# Patient Record
Sex: FEMALE | Race: WHITE | Employment: FULL TIME | URBAN - NONMETROPOLITAN AREA
[De-identification: names, ages, dates, MRNs, and addresses within clinical notes are randomized per-mention and may not be internally consistent; named-entity substitution may affect disease eponyms.]

---

## 2021-04-15 ENCOUNTER — ANESTHESIA (OUTPATIENT)
Dept: ENDOSCOPY | Age: 36
End: 2021-04-15

## 2021-04-15 ENCOUNTER — ANESTHESIA EVENT (OUTPATIENT)
Dept: ENDOSCOPY | Age: 36
End: 2021-04-15

## 2021-04-15 ENCOUNTER — HOSPITAL ENCOUNTER (OUTPATIENT)
Age: 36
Setting detail: OUTPATIENT SURGERY
Discharge: HOME | End: 2021-04-15
Attending: INTERNAL MEDICINE | Admitting: INTERNAL MEDICINE

## 2021-04-15 VITALS
DIASTOLIC BLOOD PRESSURE: 72 MMHG | OXYGEN SATURATION: 100 % | WEIGHT: 212 LBS | HEIGHT: 61 IN | RESPIRATION RATE: 18 BRPM | SYSTOLIC BLOOD PRESSURE: 118 MMHG | BODY MASS INDEX: 40.02 KG/M2 | HEART RATE: 84 BPM | TEMPERATURE: 97.4 F

## 2021-04-15 VITALS
DIASTOLIC BLOOD PRESSURE: 77 MMHG | OXYGEN SATURATION: 99 % | SYSTOLIC BLOOD PRESSURE: 116 MMHG | RESPIRATION RATE: 15 BRPM

## 2021-04-15 PROCEDURE — 2580000003 HC RX 258: Performed by: REGISTERED NURSE

## 2021-04-15 PROCEDURE — 3700000000 HC ANESTHESIA ATTENDED CARE: Performed by: INTERNAL MEDICINE

## 2021-04-15 PROCEDURE — 6360000002 HC RX W HCPCS: Performed by: REGISTERED NURSE

## 2021-04-15 PROCEDURE — 7100000010 HC PHASE II RECOVERY - FIRST 15 MIN: Performed by: INTERNAL MEDICINE

## 2021-04-15 PROCEDURE — 3609027000 HC COLONOSCOPY: Performed by: INTERNAL MEDICINE

## 2021-04-15 PROCEDURE — 3700000001 HC ADD 15 MINUTES (ANESTHESIA): Performed by: INTERNAL MEDICINE

## 2021-04-15 PROCEDURE — 2709999900 HC NON-CHARGEABLE SUPPLY: Performed by: INTERNAL MEDICINE

## 2021-04-15 PROCEDURE — 7100000011 HC PHASE II RECOVERY - ADDTL 15 MIN: Performed by: INTERNAL MEDICINE

## 2021-04-15 RX ORDER — ACETAMINOPHEN 500 MG
500 TABLET ORAL EVERY 6 HOURS PRN
COMMUNITY

## 2021-04-15 RX ORDER — SODIUM CHLORIDE 450 MG/100ML
INJECTION, SOLUTION INTRAVENOUS CONTINUOUS
Status: CANCELLED | OUTPATIENT
Start: 2021-04-15

## 2021-04-15 RX ORDER — PROPOFOL 10 MG/ML
INJECTION, EMULSION INTRAVENOUS PRN
Status: DISCONTINUED | OUTPATIENT
Start: 2021-04-15 | End: 2021-04-15 | Stop reason: SDUPTHER

## 2021-04-15 RX ORDER — IBUPROFEN 200 MG
200 TABLET ORAL EVERY 6 HOURS PRN
COMMUNITY

## 2021-04-15 RX ORDER — SODIUM CHLORIDE 450 MG/100ML
INJECTION, SOLUTION INTRAVENOUS CONTINUOUS PRN
Status: DISCONTINUED | OUTPATIENT
Start: 2021-04-15 | End: 2021-04-15 | Stop reason: SDUPTHER

## 2021-04-15 RX ADMIN — SODIUM CHLORIDE: 4.5 INJECTION, SOLUTION INTRAVENOUS at 09:10

## 2021-04-15 RX ADMIN — PROPOFOL 440 MG: 10 INJECTION, EMULSION INTRAVENOUS at 09:12

## 2021-04-15 ASSESSMENT — PAIN SCALES - GENERAL
PAINLEVEL_OUTOF10: 0
PAINLEVEL_OUTOF10: 0

## 2021-04-15 ASSESSMENT — PAIN - FUNCTIONAL ASSESSMENT: PAIN_FUNCTIONAL_ASSESSMENT: 0-10

## 2021-04-15 NOTE — ANESTHESIA PRE PROCEDURE
Department of Anesthesiology  Preprocedure Note       Name:  Chanda Perez   Age:  29 y.o.  :  1985                                          MRN:  [id number]         Date:  4/15/2021      Surgeon: Corinthia Goodpasture):  Odessa Lin MD    Procedure: Procedure(s):  COLORECTAL CANCER SCREENING, NOT HIGH RISK    Medications prior to admission:   Prior to Admission medications    Medication Sig Start Date End Date Taking? Authorizing Provider   ibuprofen (ADVIL;MOTRIN) 200 MG tablet Take 200 mg by mouth every 6 hours as needed for Pain Takes 2-4 at a time for back pain   Yes Historical Provider, MD   acetaminophen (TYLENOL) 500 MG tablet Take 500 mg by mouth every 6 hours as needed for Pain Takes 2-4 tablets as needed for pain   Yes Historical Provider, MD       Current medications:    No current facility-administered medications for this encounter. Allergies:  No Known Allergies    Problem List:  There is no problem list on file for this patient. Past Medical History:  No past medical history on file.     Past Surgical History:        Procedure Laterality Date    HYSTERECTOMY      partial \"still have my ovaries\"    MYRINGOTOMY AND TYMPANOSTOMY TUBE PLACEMENT  ? tubes placed in ears at young age   Murrel Neither TONSILLECTOMY  6    tonsils/adenoids       Social History:    Social History     Tobacco Use    Smoking status: Not on file   Substance Use Topics    Alcohol use: Not on file                                Counseling given: Not Answered      Vital Signs (Current):   Vitals:    04/15/21 0845   BP: 128/80   Pulse: 86   Resp: 16   Temp: 37.1 °C (98.8 °F)   TempSrc: Tympanic   SpO2: 100%   Weight: 212 lb (96.2 kg)   Height: 5' 1\" (1.549 m)                                              BP Readings from Last 3 Encounters:   04/15/21 128/80       NPO Status: Time of last liquid consumption: 0330                        Time of last solid consumption: 1800                        Date of last liquid consumption: 04/15/21                        Date of last solid food consumption: 04/13/21    BMI:   Wt Readings from Last 3 Encounters:   04/15/21 212 lb (96.2 kg)     Body mass index is 40.06 kg/m². CBC: No results found for: WBC, RBC, HGB, HCT, MCV, RDW, PLT    CMP:   Lab Results   Component Value Date    GLUCOSE 83 06/07/2017       POC Tests: No results for input(s): POCGLU, POCNA, POCK, POCCL, POCBUN, POCHEMO, POCHCT in the last 72 hours. Coags: No results found for: PROTIME, INR, APTT    HCG (If Applicable): No results found for: PREGTESTUR, PREGSERUM, HCG, HCGQUANT     ABGs: No results found for: PHART, PO2ART, UGY4IKU, KXQ0KJH, BEART, A8ZVLYMB     Type & Screen (If Applicable):  No results found for: LABABO, LABRH    Drug/Infectious Status (If Applicable):  No results found for: HIV, HEPCAB    COVID-19 Screening (If Applicable):   Lab Results   Component Value Date    COVID19 Not Detected 04/12/2021           Anesthesia Evaluation  Patient summary reviewed no history of anesthetic complications:   Airway: Mallampati: II        Dental: normal exam         Pulmonary:normal exam    (+) sleep apnea: on CPAP and noncompliant,                             Cardiovascular:Negative CV ROS                      Neuro/Psych:   Negative Neuro/Psych ROS              GI/Hepatic/Renal: Neg GI/Hepatic/Renal ROS            Endo/Other:    (+) blood dyscrasia: Factor V:., .                 Abdominal:           Vascular: negative vascular ROS. Anesthesia Plan      MAC     ASA 2       Induction: intravenous. Anesthetic plan and risks discussed with patient. Plan discussed with CRNA.     Attending anesthesiologist reviewed and agrees with [facility] NAGA Santiago - CRNA   4/15/2021

## 2021-04-15 NOTE — PROGRESS NOTES
Mack Cloud admitted to [facility] for Colonoscopy, procedure explained and consent form signed.

## 2021-04-15 NOTE — OP NOTE
[facility]  Colonoscopy Procedure Note      Patient: [facility]  : 1985      Procedure: Colonoscopy    Date:  4/15/2021     Endoscopist:   Emily Davenport MD    Referring Physician: Emily Davenport MD  Primary Care Physician: REYNALDO Montesinos    Indications: This is a 29y.o. year old female with factor V leiden deficiency who presents with rectal bleeding. Anesthesia: MAC per Anesthesia. Please see anesthesia report. Consent:  The patient or their legal guardian has signed a consent and is aware of the potential risks, benefits, alternatives, and potential complications of this procedure. These include, but are not limited to hemorrhage, bleeding, post procedural pain, perforation, phlebitis, aspiration, hypotension, hypoxia, cardiovascular events such as arrhythmia, and possibly death. Additionally, the possibility of missed colonic polyps and interval colon cancer was discussed in the consent. Description of Procedure: The patient was then taken to the endoscopy suite and placed in the left lateral decubitus position and the above IV sedation was administered. The perianal area was inspected and a digital rectal examination was performed. A forward-viewing Olympus video adult colonoscope was lubricated and inserted through the patient's anus into the rectum. Under direct visualization, the scope was advanced to the terminal ileum. The cecum was identified by the appendiceal orifice and ileocecal valve. Planada pictures were obtained. The prep was good. The scope was then slowly withdrawn and circumferential examination of the mucosa was performed. The scope was then withdrawn into the rectum and retroflexed. A retroflexed view of the anal verge and rectum was obtained. The scope was straightened, the colon was decompressed and the scope was withdrawn from the patient.   The patient tolerated the procedure well and was taken to the recovery area in good condition. There were no immediate complications. Estimated Blood Loss: minimal    Findings:    Terminal Ileum: Visualized and normal.    Colon: Visualized and normal.    Rectum with retroflexion: Visualized and normal except for Grade I internal hemorrhoids. Recommendations:   - Repeat colonoscopy at age 36-53 for CRC screening purposes  - Follow up with primary care physician as scheduled. - Follow up with myself for [address].     Electronically signed by Stalin Otoole MD on 4/15/2021 at 9:38 AM    Stalin Otoole MD  [facility]

## 2021-04-15 NOTE — H&P
[facility]    Pre-Operative History and Physical: Colonoscopy    Patient: Michael Garay  : 1985      History Obtained From:  patient, electronic medical record    HISTORY OF PRESENT ILLNESS:    The patient is a 29 y.o. female who present for colonoscopy with rectal bleeding. Past Medical History:    No past medical history on file. Past Surgical History:        Procedure Laterality Date    HYSTERECTOMY      partial \"still have my ovaries\"    MYRINGOTOMY AND TYMPANOSTOMY TUBE PLACEMENT  ? tubes placed in ears at young age   Mackenzie Flanagan TONSILLECTOMY  2000    tonsils/adenoids     Medications Prior to Admission:   No current facility-administered medications on file prior to encounter. Current Outpatient Medications on File Prior to Encounter   Medication Sig Dispense Refill    ibuprofen (ADVIL;MOTRIN) 200 MG tablet Take 200 mg by mouth every 6 hours as needed for Pain Takes 2-4 at a time for back pain      acetaminophen (TYLENOL) 500 MG tablet Take 500 mg by mouth every 6 hours as needed for Pain Takes 2-4 tablets as needed for pain          Allergies:  Patient has no known allergies. Social History:   TOBACCO:   has no history on file for tobacco.  ETOH:   has no history on file for alcohol. DRUGS:   has no history on file for drug. Family History:   No family history on file. PHYSICAL EXAM:      /80   Pulse 86   Temp 98.8 °F (37.1 °C) (Tympanic)   Resp 16   Ht 5' 1\" (1.549 m)   Wt 212 lb (96.2 kg)   SpO2 100%   BMI 40.06 kg/m²  I        Heart:  Regular rate and rhythm    Lungs:  No increased work of breathing    Abdomen:  BS+, soft, non-distended, non-tender, no masses palpated      ASA Grade:  See Anesthesia documentation    Mallampati Classification:  See Anesthesia documentation      ASSESSMENT AND PLAN:    1. Patient is a 29 y.o. female here for a colonoscopy with MAC    2. Procedure options, risks and benefits reviewed with patient.   We specifically discussed that risks include but are not limited to infection, bleeding, perforation, death, and missed lesions. Patient expresses understanding.       Electronically signed by Kaela Barrett MD on 4/15/2021 at [address]  [facility]

## 2021-04-15 NOTE — PROGRESS NOTES
Patient in Endoscopy and consented for colonoscopy. Patient stated no questions about procedure and confirmed understanding of procedure. Scope CFQ- 605 utilized. Photos taken and printed to chart. Patient tolerated the procedure well.

## 2021-10-26 ENCOUNTER — HOSPITAL ENCOUNTER (OUTPATIENT)
Dept: PHYSICAL THERAPY | Age: 36
Setting detail: THERAPIES SERIES
Discharge: HOME | End: 2021-10-26

## 2021-10-26 PROCEDURE — 97161 PT EVAL LOW COMPLEX 20 MIN: CPT

## 2021-10-26 NOTE — PROGRESS NOTES
** PLEASE SIGN, DATE AND TIME CERTIFICATION BELOW AND RETURN TO [facility] OUTPATIENT REHABILITATION (FAX #: [phone]). ATTEST/CO-SIGN IF ACCESSING VIA INThe [email or web address] THANK YOU.**    I certify that I have examined the patient below and determined that Physical Medicine and Rehabilitation service is necessary and that I approve the established plan of care for up to 90 days or as specifically noted. Attestation, signature or co-signature of physician indicates approval of certification requirements.    ________________________ ____________ __________  Physician Signature   Date   Time  [facility]  PHYSICAL THERAPY  [x] EVALUATION  [] DAILY NOTE (LAND) [] DAILY NOTE (AQUATIC ) [] PROGRESS NOTE [] DISCHARGE NOTE    [] [facility]   [] [facility] 90    [] [facility] [facility]   [x] Newton Thomasony    Date: 10/26/2021  Patient Name:  Derrell Cruz  : 1985  MRN: [id number]  CSN: [id number]    Referring Practitioner REYNALDO Wetzel   Diagnosis Spondylosis without myelopathy or radiculopathy, lumbar region [M47.816]  Radiculopathy, lumbar region [M54.16]    Treatment Diagnosis Back pain   Date of Evaluation 10/26/21    Additional Pertinent History n/a      Functional Outcome Measure Used Oswestry    Functional Outcome Score 20/45 or 44% disability  (10/26/21)       Insurance: Primary: Payor: / , Self-Pay  Secondary:    Authorization Information: Pre-certification not needed    Visit # 1, 1/10 for progress note   Visits Allowed: n/a   Recertification Date:    Physician Follow-Up:    Physician Orders:    History of Present Illness: [place] first noticed increase in low back pain starting back in March. She had been seeing the chiropractor 2-3 times per week for 4 months before asking for an MRI. PCP initially thought she would need surgery. She saw a surgeon in [facility] and he would not touch it.  She received a second opinion by a MD with [facility]. According to MRI she has significant degenerative changes and bulging discs. She is getting ready for injections. SUBJECTIVE: Aggravating factors include standing >4-6 hours; sleep can be difficult; walking for any length of time. Often times it's dependent on the day. Sitting for long car rides is challenging. She does have tingling down her left leg sometimes radiating down her left side and it will go down to her butt cheek on her right side. Alleviating factors include Mobic; Niecy back and body OTC. Unfortunately pain patches and medical marijuana have not alleviated her pain. Social/Functional History and Current Status:  Medications and Allergies have been reviewed and are listed on Medical History Questionnaire. Forestine Meigs lives with family in a single story home with stairs and no handrail to enter. Task Previous Current   ADLs  Independent Independent   IADL's Independent Independent   Ambulation Independent Independent   Transfers Independent Independent   Recreation Independent Independent   Community Integration Independent Independent   Driving Active  Active    Work Part-Time. 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        OBJECTIVE:    Pain: 6/10   Palpation Mild right vertical sacral rotation noted    Observation Was shifting her wt from side to side during eval   Posture        Range of Motion Increase in pain with rotation   Strength LE strength tests normal bilaterally    Coordination    Sensation    Bed Mobility    Transfers    Ambulation    Stairs Reciprocal gait pattern with ascending/descending stairs    Balance    Special Tests          TREATMENT   Precautions:    Pain:     X in shaded column indicates activity completed today   Modalities Parameters/  Location  Notes               Rock tape 1 strip x Stabilization strip across SI joint    Manual Therapy Time/Technique  Notes Exercise/Intervention   Notes   Pelvic tilt 5x  x                                                                            Specific Interventions Next Treatment: NuStep; follow up on tape    Activity/Treatment Tolerance:  [x]  Patient tolerated treatment well  []  Patient limited by fatigue  []  Patient limited by pain   []  Patient limited by medical complications  []  Other:     Assessment: Jyoti Curry is a 29 yr old woman referred to PT with chief complaint of low back pain and symptoms radiating down bilateral LE. Recent MRI indicated DDD throughout her lower lumbar spine in addition to disc herniations. Today's evaluation indicated mild deficits in ROM and core strength consistent with recent diagnosis. She will benefit from a trial of PT to establish a functional core strengthening program to prolong need for more invasive treatment. Body Structures/Functions/Activity Limitations: impaired activity tolerance, impaired endurance, impaired ROM, impaired sensation, impaired strength, pain, abnormal gait and abnormal posture  Prognosis: good    GOALS:  Patient Goal: Be rid of constant back pain    Short Term Goals:  Time Frame: 3 weeks  1. Jyoti Curry will demonstrate good abdominal stabilization to provide greater internal support to her lumbar spine. 2. Jovanna's Oswestry score will improve to <30% indicating moderate to mild disability related to her back pain. 3. Jyoti Curry will be able to stand/walk for 60+ min at a time while maintaining good abdominal stabilization so she may continue to work without limitation. Long Term Goals:  Time Frame: 6 weeks  1. Jyoti Curry will be discharged from PT with independent HEP to maintain all strength and ROM gains achieved in clinic. 2. Jovnana's Oswestry score will improve to <15% indicating minimal to no disability related to back pain.       Patient Education:   [x]  HEP/Education Completed: Plan of Care, Goals,    Medbridge Access Code:  []  No new Education completed  []  Reviewed Prior HEP      [x]  Patient verbalized and/or demonstrated understanding of education provided. []  Patient unable to verbalize and/or demonstrate understanding of education provided. Will continue education. [x]  Barriers to learning: n/a    PLAN:  Treatment Recommendations: Strengthening, Range of Motion, Gait Training, Neuromuscular Re-education, Manual Therapy - Soft Tissue Mobilization, Manual Therapy - Joint Manipulation, Pain Management, Home Exercise Program, Patient Education, Aquatics and Modalities    [x]  Plan of care initiated. Plan to see patient 2 times per week for 6 weeks to address the treatment planned outlined above.   []  Continue with current plan of care  []  Modify plan of care as follows:    []  Hold pending physician visit  []  Discharge    Time In 1600   Time Out 1645   Timed Code Minutes: 0 min   Total Treatment Time: 0 min       Electronically Signed by: Galindo Gmoez, DELFINA Landrum 7066" MIKKI NegronT  [id number]

## 2021-11-04 ENCOUNTER — APPOINTMENT (OUTPATIENT)
Dept: PHYSICAL THERAPY | Age: 36
End: 2021-11-04

## 2021-11-08 ENCOUNTER — HOSPITAL ENCOUNTER (OUTPATIENT)
Dept: PHYSICAL THERAPY | Age: 36
Setting detail: THERAPIES SERIES
Discharge: HOME | End: 2021-11-08

## 2021-11-08 PROCEDURE — 97110 THERAPEUTIC EXERCISES: CPT

## 2021-11-08 NOTE — PROGRESS NOTES
[address] [facility]  PHYSICAL THERAPY  [] EVALUATION  [x] DAILY NOTE (LAND) [] DAILY NOTE (AQUATIC ) [] PROGRESS NOTE [] DISCHARGE NOTE    [] [address] [facility]   [] [place] [address]    [] [address] [facility]   [x] Fátima Villagran    Date: 2021  Patient Name:  Brittanie Ochoa  : 1985  MRN: [id number]  CSN: [id number]    Referring Practitioner REYNALDO Sullivan   Diagnosis Spondylosis without myelopathy or radiculopathy, lumbar region [M47.816]  Radiculopathy, lumbar region [M54.16]    Treatment Diagnosis Back pain   Date of Evaluation 10/26/21    Additional Pertinent History n/a      Functional Outcome Measure Used Oswestry    Functional Outcome Score 20/45 or 44% disability  (10/26/21)       Insurance: Primary: Payor: / , Self-Pay  Secondary:    Authorization Information: Pre-certification not needed    Visit # 2, 2/10 for progress note   Visits Allowed: n/a   Recertification Date:    Physician Follow-Up:    Physician Orders:    History of Present Illness: Jyoti Curry first noticed increase in low back pain starting back in March. She had been seeing the chiropractor 2-3 times per week for 4 months before asking for an MRI. PCP initially thought she would need surgery. She saw a surgeon in [place] and he would not touch it. She received a second opinion by a MD with [facility]. According to MRI she has significant degenerative changes and bulging discs. She is getting ready for injections. SUBJECTIVE: Pt stated she has pain in all of LB with tingling down L leg. Pain is more localized in R buttock. Pt is getting injections in L4/L5 in December. Pt hasn't done heat or ice. Pt tried pain patches but they do not work, pain is to deep due to arthritis per Dr. Carmelina Sauer didn't notice any difference with taping.                TREATMENT   Precautions:    Pain: 3/10 low back    X in shaded column indicates activity completed today Modalities Parameters/  Location  Notes               Rock tape 1 strip  Stabilization strip across SI joint    Manual Therapy Time/Technique  Notes                     Exercise/Intervention   Notes   Nustep Level 5 5min x           Hooklying; glut sets, Pelvic tilt 10x 5sec x On P   Hooklying; PPT with marching 10x  x On [facility]   Hooklying: Abdominal bracing 10x 5sec x MHP   LTR 10x  x MHP   Adductor squeeze, isometric hip abduction 10x 5sec x MHP          Single knee to chest, piriformis stretch 15sec 3x x On MHP   HS stretch 15sec 3x x On [facility]   Ended with DKTC [id number]  x On [facility]                          Specific Interventions Next Treatment: NuStep; follow up on tape    Activity/Treatment Tolerance:  [x]  Patient tolerated treatment well  []  Patient limited by fatigue  []  Patient limited by pain   []  Patient limited by medical complications  []  Other:     Assessment: Pt tolerated session well with initiation of therapeutic exercises. Main focus was on core engagement in [address]ir 115[address]. Pt had no increase in low back pain at end of session. Pt to monitor results. Body Structures/Functions/Activity Limitations: impaired activity tolerance, impaired endurance, impaired ROM, impaired sensation, impaired strength, pain, abnormal gait and abnormal posture  Prognosis: good    GOALS:  Patient Goal: Be rid of constant back pain    Short Term Goals:  Time Frame: 3 weeks  1. Amie Dobbs will demonstrate good abdominal stabilization to provide greater internal support to her lumbar spine. 2. Jovanna's Oswestry score will improve to <30% indicating moderate to mild disability related to her back pain. 3. Amie Dobbs will be able to stand/walk for 60+ min at a time while maintaining good abdominal stabilization so she may continue to work without limitation. Long Term Goals:  Time Frame: 6 weeks  1. Amie Dobbs will be discharged from PT with independent HEP to maintain all strength and ROM gains achieved in clinic.   2. Jovanna's Oswestry score will improve to <15% indicating minimal to no disability related to back pain. Patient Education:   [x]  HEP/Education Completed: figure four stretch, HS, SKTC stretch, abdominal bracing. [address] [employer] [address]  []  No new Education completed  []  Reviewed Prior HEP      [x]  Patient verbalized and/or demonstrated understanding of education provided. []  Patient unable to verbalize and/or demonstrate understanding of education provided. Will continue education. [x]  Barriers to learning: n/a    PLAN:  Treatment Recommendations: Strengthening, Range of Motion, Gait Training, Neuromuscular Re-education, Manual Therapy - Soft Tissue Mobilization, Manual Therapy - Joint Manipulation, Pain Management, Home Exercise Program, Patient Education, Aquatics and Modalities    []  Plan of care initiated. Plan to see patient 2 times per week for 6 weeks to address the treatment planned outlined above.   [x]  Continue with current plan of care  []  Modify plan of care as follows:    []  Hold pending physician visit  []  Discharge    Time In 0830   Time Out 0905   Timed Code Minutes: 35 min   Total Treatment Time: 35 min       Electronically Signed by: Hailey Aldrich PTA

## 2021-11-15 ENCOUNTER — HOSPITAL ENCOUNTER (OUTPATIENT)
Dept: PHYSICAL THERAPY | Age: 36
Setting detail: THERAPIES SERIES
Discharge: HOME | End: 2021-11-15

## 2021-11-15 PROCEDURE — 97140 MANUAL THERAPY 1/> REGIONS: CPT

## 2021-11-15 NOTE — PROGRESS NOTES
L5 and L2) 2 inch x Bilaterally   DN: Paravetrebral L3 and L4 2 inch x Bilaterally    DN: Homeostatic point #16  3 inch x Bilaterally    Exercise/Intervention   Notes   Nustep Level 5 5min            Hooklying; glut sets, Pelvic tilt 10x 5sec  On [facility]   Hooklying; PPT with marching 10x   On [facility]   Hooklying: Abdominal bracing 10x 5sec  M[facility]   LTR 10x   [facility]   Adductor squeeze, isometric hip abduction 10x 5sec  P          Single knee to chest, piriformis stretch 15sec 3x  On [facility]   HS stretch 15sec 3x  On [facility]   Ended with DKTC [id number]   On [facility]                          Specific Interventions Next Treatment: NuStep; follow up on tape    Activity/Treatment Tolerance:  [x]  Patient tolerated treatment well  []  Patient limited by fatigue  []  Patient limited by pain   []  Patient limited by medical complications  []  Other:     Assessment: Trial of needling to increase blood flow and healing by creating microtrauma along bilateral peripheral nerve roots. Body Structures/Functions/Activity Limitations: impaired activity tolerance, impaired endurance, impaired ROM, impaired sensation, impaired strength, pain, abnormal gait and abnormal posture  Prognosis: good    GOALS:  Patient Goal: Be rid of constant back pain    Short Term Goals:  Time Frame: 3 weeks  1. Jyoti Curry will demonstrate good abdominal stabilization to provide greater internal support to her lumbar spine. 2. Jovanna's Oswestry score will improve to <30% indicating moderate to mild disability related to her back pain. 3. Jyoti Curry will be able to stand/walk for 60+ min at a time while maintaining good abdominal stabilization so she may continue to work without limitation. Long Term Goals:  Time Frame: 6 weeks  1. Jyoti Curry will be discharged from PT with independent HEP to maintain all strength and ROM gains achieved in clinic. 2. Jovanna's Oswestry score will improve to <15% indicating minimal to no disability related to back pain.       Patient Education:   [x]  HEP/Education Completed: figure four stretch, HS, SKTC stretch, abdominal bracing. [address]  []  No new Education completed  []  Reviewed Prior HEP      [x]  Patient verbalized and/or demonstrated understanding of education provided. []  Patient unable to verbalize and/or demonstrate understanding of education provided. Will continue education. [x]  Barriers to learning: n/a    PLAN:  Treatment Recommendations: Strengthening, Range of Motion, Gait Training, Neuromuscular Re-education, Manual Therapy - Soft Tissue Mobilization, Manual Therapy - Joint Manipulation, Pain Management, Home Exercise Program, Patient Education, Aquatics and Modalities    []  Plan of care initiated. Plan to see patient 2 times per week for 6 weeks to address the treatment planned outlined above.   [x]  Continue with current plan of care  []  Modify plan of care as follows:    []  Hold pending physician visit  []  Discharge    Time In 0930   Time Out 0950   Timed Code Minutes: 20 min   Total Treatment Time: 20 min       Electronically Signed by: Ridge Liang, PT   Ramesh Landrum [id number]"Silvana Couch DPT  [id number]

## 2021-11-22 ENCOUNTER — HOSPITAL ENCOUNTER (OUTPATIENT)
Dept: PHYSICAL THERAPY | Age: 36
Setting detail: THERAPIES SERIES
Discharge: HOME | End: 2021-11-22

## 2021-11-22 NOTE — DISCHARGE SUMMARY
[facility] NOTE  OUTPATIENT  [address]    Patient Name: Ian Young        CSN: [id number]   YOB: 1985  Gender: female  Anai Greene, Fahadma,    Spondylosis without myelopathy or radiculopathy, lumbar region [M47.816]  Radiculopathy, lumbar region [M54.16] ,      Patient is discharged from Physical Therapy services at this time. See last note for details related to results of therapy and goal achievement. Reason for discharge: After chatting on the phone, Ruperto Dougherty shared that the dry needling increased pain on the left side and overall therapy has not helped. Because she is self-pay she asked to be discharge and she is scheduled to receive injections with her MD next month. Marcos Patel \"CALEB\Luis Quezada, ONELIA  [id number]

## 2025-03-11 ENCOUNTER — HOSPITAL ENCOUNTER (EMERGENCY)
Age: 40
Discharge: HOME | End: 2025-03-11
Attending: EMERGENCY MEDICINE

## 2025-03-11 ENCOUNTER — APPOINTMENT (OUTPATIENT)
Dept: CT IMAGING | Age: 40
End: 2025-03-11

## 2025-03-11 VITALS
OXYGEN SATURATION: 100 % | HEART RATE: 85 BPM | BODY MASS INDEX: 37.76 KG/M2 | HEIGHT: 61 IN | SYSTOLIC BLOOD PRESSURE: 132 MMHG | TEMPERATURE: 98.1 F | RESPIRATION RATE: 16 BRPM | WEIGHT: 200 LBS | DIASTOLIC BLOOD PRESSURE: 70 MMHG

## 2025-03-11 DIAGNOSIS — M54.17 RADICULOPATHY OF LUMBOSACRAL REGION: Primary | ICD-10-CM

## 2025-03-11 PROCEDURE — 6370000000 HC RX 637 (ALT 250 FOR IP): Performed by: PHYSICIAN ASSISTANT

## 2025-03-11 PROCEDURE — 6360000002 HC RX W HCPCS: Performed by: PHYSICIAN ASSISTANT

## 2025-03-11 PROCEDURE — 96372 THER/PROPH/DIAG INJ SC/IM: CPT

## 2025-03-11 PROCEDURE — 72131 CT LUMBAR SPINE W/O DYE: CPT

## 2025-03-11 PROCEDURE — 99284 EMERGENCY DEPT VISIT MOD MDM: CPT

## 2025-03-11 PROCEDURE — 6370000000 HC RX 637 (ALT 250 FOR IP): Performed by: EMERGENCY MEDICINE

## 2025-03-11 RX ORDER — HYDROCODONE BITARTRATE AND ACETAMINOPHEN 5; 325 MG/1; MG/1
1 TABLET ORAL ONCE
Refills: 0 | Status: COMPLETED | OUTPATIENT
Start: 2025-03-11 | End: 2025-03-11

## 2025-03-11 RX ORDER — HYDROCODONE BITARTRATE AND ACETAMINOPHEN 5; 325 MG/1; MG/1
1 TABLET ORAL EVERY 6 HOURS PRN
Qty: 12 TABLET | Refills: 0 | Status: SHIPPED | OUTPATIENT
Start: 2025-03-11 | End: 2025-03-14

## 2025-03-11 RX ORDER — LIDOCAINE 4 G/G
1 PATCH TOPICAL DAILY
Status: DISCONTINUED | OUTPATIENT
Start: 2025-03-11 | End: 2025-03-11 | Stop reason: HOSPADM

## 2025-03-11 RX ORDER — KETOROLAC TROMETHAMINE 30 MG/ML
30 INJECTION, SOLUTION INTRAMUSCULAR; INTRAVENOUS ONCE
Status: COMPLETED | OUTPATIENT
Start: 2025-03-11 | End: 2025-03-11

## 2025-03-11 RX ORDER — PREDNISONE 20 MG/1
40 TABLET ORAL DAILY
Qty: 10 TABLET | Refills: 0 | Status: SHIPPED | OUTPATIENT
Start: 2025-03-11 | End: 2025-03-16

## 2025-03-11 RX ADMIN — HYDROCODONE BITARTRATE AND ACETAMINOPHEN 1 TABLET: 5; 325 TABLET ORAL at 13:51

## 2025-03-11 RX ADMIN — KETOROLAC TROMETHAMINE 30 MG: 30 INJECTION, SOLUTION INTRAMUSCULAR at 12:54

## 2025-03-11 ASSESSMENT — PAIN DESCRIPTION - DESCRIPTORS: DESCRIPTORS: SHARP;SHOOTING

## 2025-03-11 ASSESSMENT — PAIN SCALES - GENERAL: PAINLEVEL_OUTOF10: 10

## 2025-03-11 ASSESSMENT — PAIN DESCRIPTION - PAIN TYPE: TYPE: ACUTE PAIN

## 2025-03-11 ASSESSMENT — PAIN DESCRIPTION - LOCATION: LOCATION: BACK

## 2025-03-11 ASSESSMENT — PAIN DESCRIPTION - FREQUENCY: FREQUENCY: CONTINUOUS

## 2025-03-11 ASSESSMENT — PAIN - FUNCTIONAL ASSESSMENT: PAIN_FUNCTIONAL_ASSESSMENT: 0-10

## 2025-03-11 NOTE — ED PROVIDER NOTES
[facility] EMERGENCY DEPARTMENT      EMERGENCY MEDICINE     Pt Name: Jovanna Moreno  MRN: [id number]  Birthdate 1985  Date of evaluation: 3/11/2025  Provider: Guera Lee PA-C    CHIEF COMPLAINT       Chief Complaint   Patient presents with    Back Pain     Right Lower Side      HISTORY OF PRESENT ILLNESS   Jovanna Moreno is a pleasant 40 y.o. female who presents to the emergency department from from home, by private vehicle for evaluation of lower back pain.  Patient states she has a history of lumbar sacral radiculopathy as well as a herniated disc degeneration.  She currently sees [facility] neurology for symptom management.  She states over the last 2 days her muscle relaxer and ibuprofen have not been helping with her pain.  She states the pain radiates down her leg and up into her shoulder blade.  She states deep breaths can trigger the sharp pain, and nothing is making it better.  She denies numbness, tingling, decrease sensation, saddle anesthesia, urinary retention, or loss of bowel or bladder.  She denies any other issues at this time.    PASTMEDICAL HISTORY   No past medical history on file.    There is no problem list on file for this patient.    SURGICAL HISTORY       Past Surgical History:   Procedure Laterality Date    COLONOSCOPY N/A 4/15/2021    COLONOSCOPY DIAGNOSTIC performed by Anaid Simmons MD at [facility]    HYSTERECTOMY (CERVIX STATUS UNKNOWN)  2014    partial \"still have my ovaries\"    MYRINGOTOMY AND TYMPANOSTOMY TUBE PLACEMENT  ? tubes placed in ears at young age    TONSILLECTOMY  2000    tonsils/adenoids       CURRENT MEDICATIONS       Discharge Medication List as of 3/11/2025  2:05 PM        CONTINUE these medications which have NOT CHANGED    Details   ibuprofen (ADVIL;MOTRIN) 200 MG tablet Take 200 mg by mouth every 6 hours as needed for Pain Takes 2-4 at a time for back painHistorical Med      acetaminophen (TYLENOL) 500 MG tablet Take 500 mg by mouth

## 2025-03-11 NOTE — ED NOTES
Patient presents to the ED with c/o of back pain that started approximately last week. Patient states she had injections approximately 2 years ago for a bulging disk. Patient states she has not taken any pain medication today. Vital signs obtained. Call light within reach. Denies needs at this time.

## 2025-03-11 NOTE — DISCHARGE INSTRUCTIONS
Follow up with Nikko Byrne due to worsening radiculopathy as soon as able to get in. Return to ED if symptoms worsen, loss of bowel or bladder, or weakness occurs.

## (undated) DEVICE — GLOVE ORTHO 8   MSG9480